# Patient Record
Sex: FEMALE | Race: OTHER | NOT HISPANIC OR LATINO | URBAN - METROPOLITAN AREA
[De-identification: names, ages, dates, MRNs, and addresses within clinical notes are randomized per-mention and may not be internally consistent; named-entity substitution may affect disease eponyms.]

---

## 2022-06-09 ENCOUNTER — APPOINTMENT (RX ONLY)
Dept: URBAN - METROPOLITAN AREA CLINIC 31 | Facility: CLINIC | Age: 67
Setting detail: DERMATOLOGY
End: 2022-06-09

## 2022-06-09 DIAGNOSIS — D49.2 NEOPLASM OF UNSPECIFIED BEHAVIOR OF BONE, SOFT TISSUE, AND SKIN: ICD-10-CM

## 2022-06-09 PROCEDURE — ? BIOPSY BY SHAVE METHOD

## 2022-06-09 PROCEDURE — 11102 TANGNTL BX SKIN SINGLE LES: CPT

## 2022-06-09 ASSESSMENT — LOCATION DETAILED DESCRIPTION DERM: LOCATION DETAILED: LEFT DISTAL PRETIBIAL REGION

## 2022-06-09 ASSESSMENT — LOCATION ZONE DERM: LOCATION ZONE: LEG

## 2022-06-09 ASSESSMENT — LOCATION SIMPLE DESCRIPTION DERM: LOCATION SIMPLE: LEFT PRETIBIAL REGION

## 2022-06-09 NOTE — PROCEDURE: MIPS QUALITY
Quality 111:Pneumonia Vaccination Status For Older Adults: Pneumococcal vaccine was not administered on or after patient’s 60th birthday and before the end of the measurement period, reason not otherwise specified

## 2022-06-09 NOTE — PROCEDURE: BIOPSY BY SHAVE METHOD
Detail Level: Detailed
Depth Of Biopsy: dermis
Was A Bandage Applied: Yes
Size Of Lesion In Cm: 3.5
X Size Of Lesion In Cm: 2.5
Biopsy Type: H and E
Biopsy Method: Dermablade
Anesthesia Type: 1% lidocaine with epinephrine
Anesthesia Volume In Cc: 0.5
Additional Anesthesia Volume In Cc (Will Not Render If 0): 0
Hemostasis: Electrocautery
Wound Care: Petrolatum
Dressing: bandage
Destruction After The Procedure: No
Type Of Destruction Used: Curettage
Curettage Text: The wound bed was treated with curettage after the biopsy was performed.
Cryotherapy Text: The wound bed was treated with cryotherapy after the biopsy was performed.
Electrodesiccation Text: The wound bed was treated with electrodesiccation after the biopsy was performed.
Electrodesiccation And Curettage Text: The wound bed was treated with electrodesiccation and curettage after the biopsy was performed.
Silver Nitrate Text: The wound bed was treated with silver nitrate after the biopsy was performed.
Lab: [id number]
Lab Facility: 
Consent: Written consent was obtained and risks were reviewed including but not limited to scarring, infection, bleeding, scabbing, incomplete removal, nerve damage and allergy to anesthesia.
Post-Care Instructions: I reviewed with the patient in detail post-care instructions. Patient is to keep the biopsy site dry overnight, and then apply bacitracin twice daily until healed. Patient may apply hydrogen peroxide soaks to remove any crusting.
Notification Instructions: Patient will be notified of biopsy results. However, patient instructed to call the office if not contacted within 2 weeks.
Billing Type: Third-Party Bill
Information: Selecting Yes will display possible errors in your note based on the variables you have selected. This validation is only offered as a suggestion for you. PLEASE NOTE THAT THE VALIDATION TEXT WILL BE REMOVED WHEN YOU FINALIZE YOUR NOTE. IF YOU WANT TO FAX A PRELIMINARY NOTE YOU WILL NEED TO TOGGLE THIS TO 'NO' IF YOU DO NOT WANT IT IN YOUR FAXED NOTE.

## 2022-11-10 ENCOUNTER — APPOINTMENT (RX ONLY)
Dept: URBAN - METROPOLITAN AREA CLINIC 31 | Facility: CLINIC | Age: 67
Setting detail: DERMATOLOGY
End: 2022-11-10

## 2022-11-10 DIAGNOSIS — L57.0 ACTINIC KERATOSIS: ICD-10-CM

## 2022-11-10 DIAGNOSIS — Z71.89 OTHER SPECIFIED COUNSELING: ICD-10-CM

## 2022-11-10 DIAGNOSIS — D18.0 HEMANGIOMA: ICD-10-CM

## 2022-11-10 DIAGNOSIS — D22 MELANOCYTIC NEVI: ICD-10-CM

## 2022-11-10 DIAGNOSIS — Z85.820 PERSONAL HISTORY OF MALIGNANT MELANOMA OF SKIN: ICD-10-CM

## 2022-11-10 DIAGNOSIS — L72.8 OTHER FOLLICULAR CYSTS OF THE SKIN AND SUBCUTANEOUS TISSUE: ICD-10-CM

## 2022-11-10 PROBLEM — D22.9 MELANOCYTIC NEVI, UNSPECIFIED: Status: ACTIVE | Noted: 2022-11-10

## 2022-11-10 PROBLEM — D18.01 HEMANGIOMA OF SKIN AND SUBCUTANEOUS TISSUE: Status: ACTIVE | Noted: 2022-11-10

## 2022-11-10 PROCEDURE — 99213 OFFICE O/P EST LOW 20 MIN: CPT | Mod: 25

## 2022-11-10 PROCEDURE — ? COUNSELING

## 2022-11-10 PROCEDURE — 17003 DESTRUCT PREMALG LES 2-14: CPT

## 2022-11-10 PROCEDURE — ? ADDITIONAL NOTES

## 2022-11-10 PROCEDURE — 17000 DESTRUCT PREMALG LESION: CPT

## 2022-11-10 PROCEDURE — ? LIQUID NITROGEN

## 2022-11-10 ASSESSMENT — LOCATION DETAILED DESCRIPTION DERM
LOCATION DETAILED: LEFT SUPERIOR FOREHEAD
LOCATION DETAILED: LEFT LATERAL BUCCAL CHEEK
LOCATION DETAILED: RIGHT SUPERIOR LATERAL FOREHEAD
LOCATION DETAILED: LEFT LATERAL FRONTAL SCALP
LOCATION DETAILED: NASAL ROOT

## 2022-11-10 ASSESSMENT — LOCATION SIMPLE DESCRIPTION DERM
LOCATION SIMPLE: LEFT SCALP
LOCATION SIMPLE: LEFT FOREHEAD
LOCATION SIMPLE: RIGHT FOREHEAD
LOCATION SIMPLE: NOSE
LOCATION SIMPLE: LEFT CHEEK

## 2022-11-10 ASSESSMENT — LOCATION ZONE DERM
LOCATION ZONE: FACE
LOCATION ZONE: NOSE
LOCATION ZONE: SCALP

## 2022-11-10 NOTE — PROCEDURE: LIQUID NITROGEN
Post-Care Instructions: I reviewed with the patient in detail post-care instructions. Patient is to wear sunprotection, and avoid picking at any of the treated lesions. Pt may apply Vaseline to crusted or scabbing areas.
Duration Of Freeze Thaw-Cycle (Seconds): 3
Application Tool (Optional): Liquid Nitrogen Sprayer
Render Note In Bullet Format When Appropriate: No
Show Aperture Variable?: Yes
Consent: The patient's consent was obtained including but not limited to risks of crusting, scabbing, blistering, scarring, darker or lighter pigmentary change, recurrence, incomplete removal and infection.
Detail Level: Detailed
Number Of Freeze-Thaw Cycles: 1 freeze-thaw cycle

## 2022-11-10 NOTE — PROCEDURE: ADDITIONAL NOTES
Additional Notes: -- Well-healed split-thickness skin graft scar on the left superior anterior thigh.\\n-- Continue with q3 month f/u at [facility] \\n-- Continue with Keytruda infusions co-managed by Linda and Dr. Meier. Referral already sent.\\n
Detail Level: Simple
Render Risk Assessment In Note?: yes

## 2022-11-10 NOTE — PROCEDURE: MIPS QUALITY
Quality 226: Preventive Care And Screening: Tobacco Use: Screening And Cessation Intervention: Patient screened for tobacco use and is an ex/non-smoker
Detail Level: Detailed
Quality 111:Pneumonia Vaccination Status For Older Adults: Pneumococcal vaccine (PPSV23) was not administered on or after patient’s 60th birthday and before the end of the measurement period, reason not otherwise specified
Quality 431: Preventive Care And Screening: Unhealthy Alcohol Use - Screening: Patient not identified as an unhealthy alcohol user when screened for unhealthy alcohol use using a systematic screening method
Quality 130: Documentation Of Current Medications In The Medical Record: Current Medications Documented
Quality 110: Preventive Care And Screening: Influenza Immunization: Influenza Immunization Administered during Influenza season

## 2023-02-08 ENCOUNTER — APPOINTMENT (RX ONLY)
Dept: URBAN - METROPOLITAN AREA CLINIC 31 | Facility: CLINIC | Age: 68
Setting detail: DERMATOLOGY
End: 2023-02-08

## 2023-02-08 DIAGNOSIS — M71 OTHER BURSOPATHIES: ICD-10-CM

## 2023-02-08 DIAGNOSIS — Z71.89 OTHER SPECIFIED COUNSELING: ICD-10-CM

## 2023-02-08 DIAGNOSIS — L57.0 ACTINIC KERATOSIS: ICD-10-CM

## 2023-02-08 DIAGNOSIS — L81.4 OTHER MELANIN HYPERPIGMENTATION: ICD-10-CM

## 2023-02-08 DIAGNOSIS — D22 MELANOCYTIC NEVI: ICD-10-CM

## 2023-02-08 DIAGNOSIS — Z85.820 PERSONAL HISTORY OF MALIGNANT MELANOMA OF SKIN: ICD-10-CM

## 2023-02-08 DIAGNOSIS — D18.0 HEMANGIOMA: ICD-10-CM

## 2023-02-08 DIAGNOSIS — L27.0 GENERALIZED SKIN ERUPTION DUE TO DRUGS AND MEDICAMENTS TAKEN INTERNALLY: ICD-10-CM

## 2023-02-08 DIAGNOSIS — L82.1 OTHER SEBORRHEIC KERATOSIS: ICD-10-CM

## 2023-02-08 PROBLEM — D22.5 MELANOCYTIC NEVI OF TRUNK: Status: ACTIVE | Noted: 2023-02-08

## 2023-02-08 PROBLEM — M71.372 OTHER BURSAL CYST, LEFT ANKLE AND FOOT: Status: ACTIVE | Noted: 2023-02-08

## 2023-02-08 PROBLEM — D18.01 HEMANGIOMA OF SKIN AND SUBCUTANEOUS TISSUE: Status: ACTIVE | Noted: 2023-02-08

## 2023-02-08 PROCEDURE — ? ADDITIONAL NOTES

## 2023-02-08 PROCEDURE — ? COUNSELING

## 2023-02-08 PROCEDURE — 17000 DESTRUCT PREMALG LESION: CPT

## 2023-02-08 PROCEDURE — 17003 DESTRUCT PREMALG LES 2-14: CPT

## 2023-02-08 PROCEDURE — 99213 OFFICE O/P EST LOW 20 MIN: CPT | Mod: 25

## 2023-02-08 PROCEDURE — ? LIQUID NITROGEN

## 2023-02-08 ASSESSMENT — LOCATION DETAILED DESCRIPTION DERM
LOCATION DETAILED: RIGHT UPPER CUTANEOUS LIP
LOCATION DETAILED: RIGHT MID-UPPER BACK
LOCATION DETAILED: LEFT DORSAL 2ND TOE
LOCATION DETAILED: RIGHT SUPERIOR LATERAL MALAR CHEEK
LOCATION DETAILED: RIGHT INFERIOR UPPER BACK
LOCATION DETAILED: LEFT DISTAL PRETIBIAL REGION
LOCATION DETAILED: RIGHT SUPERIOR UPPER BACK

## 2023-02-08 ASSESSMENT — LOCATION ZONE DERM
LOCATION ZONE: TOE
LOCATION ZONE: TRUNK
LOCATION ZONE: LEG
LOCATION ZONE: LIP
LOCATION ZONE: FACE

## 2023-02-08 ASSESSMENT — LOCATION SIMPLE DESCRIPTION DERM
LOCATION SIMPLE: RIGHT LIP
LOCATION SIMPLE: LEFT 2ND TOE
LOCATION SIMPLE: RIGHT UPPER BACK
LOCATION SIMPLE: RIGHT CHEEK
LOCATION SIMPLE: LEFT PRETIBIAL REGION

## 2023-02-08 NOTE — PROCEDURE: ADDITIONAL NOTES
Additional Notes: Patient states that she had a reaction to her Keytruda her Oncologist is aware and is not concerned about the reaction and it’s well controlled with the topical Triamcinolone
Render Risk Assessment In Note?: no
Detail Level: Detailed
Additional Notes: Last CT/PET 12/2022 clear, next one planned for 3/2023. Pt continuing Keytruda with Dr Meier, f/u with [facility] next week.

## 2023-02-08 NOTE — HPI: RASH
What Type Of Note Output Would You Prefer (Optional)?: Standard Output
Is The Patient Presenting As Previously Scheduled?: Yes
Is This A New Presentation, Or A Follow-Up?: Rash
Additional History: Patient states that this a result of her Kertruda, she was treated by her Oncologist prescribed Triamcinolone

## 2023-02-08 NOTE — PROCEDURE: MIPS QUALITY
Detail Level: Detailed
Quality 137: Melanoma: Continuity Of Care - Recall System: Patient information entered into a recall system that includes: target date for the next exam specified AND a process to follow up with patients regarding missed or unscheduled appointments
Quality 138: Melanoma: Coordination Of Care: A treatment plan was communicated to the physicians providing continuing care within one month of diagnosis outlining: diagnosis, tumor thickness and a plan for surgery or alternate care.
Quality 130: Documentation Of Current Medications In The Medical Record: Current Medications Documented
Quality 110: Preventive Care And Screening: Influenza Immunization: Influenza Immunization Administered during Influenza season
Quality 431: Preventive Care And Screening: Unhealthy Alcohol Use - Screening: Patient not identified as an unhealthy alcohol user when screened for unhealthy alcohol use using a systematic screening method
Quality 226: Preventive Care And Screening: Tobacco Use: Screening And Cessation Intervention: Patient screened for tobacco use and is an ex/non-smoker
Quality 397: Melanoma: Reporting: Pathology report includes the pT Category, thickness, ulceration and mitotic rate, peripheral and deep margin status and presence or absence of microsatellitosis for invasive tumors.
Quality 111:Pneumonia Vaccination Status For Older Adults: Pneumococcal vaccine (PPSV23) administered on or after patient’s 60th birthday and before the end of the measurement period

## 2023-02-08 NOTE — HPI: MELANOMA F/U (HISTORY OF MALIGNANT MELANOMA)
What Is The Reason For Today's Visit?: History of Melanoma
Additional History: Biopsied by Dr Lopez 6/2022, L shin 5.6 mm, One SLN positive, exc at [facility] 7/2022, on Keytruda with Dr Meier
Year Excised?: 7/2022 MM located on L shin 5.6 mm, one SLN positive

## 2023-04-27 ENCOUNTER — APPOINTMENT (RX ONLY)
Dept: URBAN - METROPOLITAN AREA CLINIC 31 | Facility: CLINIC | Age: 68
Setting detail: DERMATOLOGY
End: 2023-04-27

## 2023-04-27 DIAGNOSIS — L82.0 INFLAMED SEBORRHEIC KERATOSIS: ICD-10-CM

## 2023-04-27 PROCEDURE — ? COUNSELING

## 2023-04-27 PROCEDURE — 99212 OFFICE O/P EST SF 10 MIN: CPT

## 2023-04-27 ASSESSMENT — LOCATION SIMPLE DESCRIPTION DERM: LOCATION SIMPLE: RIGHT BREAST

## 2023-04-27 ASSESSMENT — LOCATION DETAILED DESCRIPTION DERM: LOCATION DETAILED: RIGHT INFRAMAMMARY CREASE (INNER QUADRANT)

## 2023-04-27 ASSESSMENT — LOCATION ZONE DERM: LOCATION ZONE: TRUNK

## 2023-04-27 NOTE — PROCEDURE: MIPS QUALITY
Quality 110: Preventive Care And Screening: Influenza Immunization: Influenza Immunization previously received during influenza season
Quality 431: Preventive Care And Screening: Unhealthy Alcohol Use - Screening: Patient not identified as an unhealthy alcohol user when screened for unhealthy alcohol use using a systematic screening method
Quality 111:Pneumonia Vaccination Status For Older Adults: Pneumococcal vaccine (PPSV23) administered on or after patient’s 60th birthday and before the end of the measurement period
Quality 226: Preventive Care And Screening: Tobacco Use: Screening And Cessation Intervention: Patient screened for tobacco use and is an ex/non-smoker
Detail Level: Detailed
Quality 130: Documentation Of Current Medications In The Medical Record: Current Medications Documented

## 2024-12-11 ENCOUNTER — TRANSCRIBE ORDERS (OUTPATIENT)
Dept: SCHEDULING | Facility: REHABILITATION | Age: 69
End: 2024-12-11

## 2024-12-11 DIAGNOSIS — C43.9 MALIGNANT MELANOMA OF SKIN, UNSPECIFIED: Primary | ICD-10-CM

## 2024-12-17 ENCOUNTER — HOSPITAL ENCOUNTER (OUTPATIENT)
Dept: PHYSICAL THERAPY | Facility: HOSPITAL | Age: 69
Setting detail: THERAPIES SERIES
Discharge: HOME | End: 2024-12-17
Attending: NURSE PRACTITIONER
Payer: MEDICARE

## 2024-12-17 DIAGNOSIS — I89.0 LYMPHEDEMA: Primary | ICD-10-CM

## 2024-12-17 DIAGNOSIS — C43.9 MALIGNANT MELANOMA OF SKIN, UNSPECIFIED: ICD-10-CM

## 2024-12-17 PROCEDURE — 97162 PT EVAL MOD COMPLEX 30 MIN: CPT | Mod: GP

## 2024-12-17 NOTE — LETTER
Dear DR. Cyr,    Thank you for this referral. Please review the attached notes and plan of care for your approval.  Please contact our department with any questions.     Sincerely,     Adri Pope, PT  [address], [address]  [place] [address]  Phone [phone]  Fax  [phone]    By co-signing this Plan of Care (POC) you agree to the following:  I have reviewed the the Plan of Care established by the therapist within this document and certify that the services are skilled and medically necessary. I have reviewed the plan and recommend that these services continue to meet the goals stated in this document.    PHYSICIAN SIGNATURE: __________________________________     DATE: ___________________  TIME: _____________           Physical Therapy Plan of Care 24   Effective from: 2024  Effective to: 2025    Plan ID: [id number]            Participants as of Finalize on 2024    Name Type Comments Contact Info    Adri Cyr Referring Provider  [phone]    Adri Pope, PT Physical Therapist         Last Progress Notes Note     Author: Adri Pope, PT Status: Signed Last edited: 2024 11:00 AM       Physical Therapy Evaluation    [facility] Fax: [phone]    PT EVALUATION FOR OUTPATIENT THERAPY    Patient: Ivon Dickens    MRN: [id number]  : 1955 69 y.o.     Referring Physician: Adri Cyr  Date of Visit: 2024      Certification Dates:  24 through 25         Recommended Frequency & Duration:  2 times/week for up to 8 weeks     Diagnosis:   1. Lymphedema    2. Malignant melanoma of skin, unspecified (CMS/HCC)        Chief Complaints:   Chief Complaint   Patient presents with   • Dec Strength   • Pain   • Other     Edema   • Decreased recreational/play activity   • Self Care Difficulties       Precautions:    Precautions additional comments: Melanoma L LE, L inguinal lymph nodes  removed    Past Medical History: History reviewed. No pertinent past medical history.    Past Surgical History: No past surgical history on file.      LEARNING ASSESSMENT    Assessment completed:  Yes    Learner name:  Ivon    Learner: Patient    Learning Barriers:  Learning barriers: No Barriers    Preferred Language: English     Needed: No    Education Provided:   Method: Discussion  Readiness: acceptance  Response: Demonstrated understanding      CO-LEARNER ASSESSMENT:    Completed: No      Welcome letter discussed: Yes Patient provided with Welcome Letter, which includes attendance policy. Provided education regarding cancellation and no-show policy. Education regarding the importance of participation and regular attendance to maximize goal attainment.       OBJECTIVE MEASUREMENTS/DATA:    Time In Session:  Start Time: 1103  Stop Time: 1140  Time Calculation (min): 37 min     General Information - 12/17/24 1102          Session Details    Document Type initial evaluation     Mode of Treatment individual therapy        General Information    Referring Physician Adri Cyr     History of present illness/functional impairment Pt is a 70 y/o female who presents to OPPT IE with a dx of L LE lymphedema following surgery for melanoma in her L LE in 2022. She had this surgery and was diagnosed in November 2022. She wore medi reduction wraps for her entire L LE and now wears a compression thigh high stocking daily. She also does self MLD daily. She had inguinal lymphadenectomy surgery on 12/12/24 and now is worried about a flare up of her lymphedema. She does notice her L LE is more swollen. She has a drain still currently in. She lives in [place] but is in [place] often as she sees an oncologist at [place]. She will likely be local until the middle of January. She would like to come to therapy if possible while she is still in town. She is interested in compression biker shorts and potentially a  compression pump.     Precautions comments Melanoma L LE, L inguinal lymph nodes removed         Services    Do You Speak a Language Other Than English at Home? no        Behavioral Health Related Communication    Suicidal Ideation No                    Pain/Vitals - 12/17/24 1102          Pain Assessment    Currently in pain No/Denies                    Falls/Food Screening - 12/17/24 1102          Initial Falls Assessment    One or more falls in the last year No        Food Insecurity    Within the past 12 months, you worried that your food would run out before you got the money to buy more. Never true     Within the past 12 months, the food you bought just didn't last and you didn't have money to get more. Never true                    PT - 12/17/24 1102          Physical Therapy    Physical Therapy Specialty Lymphedema Program        PT Plan    Frequency of treatment 2 times/week     PT Duration 8 weeks     PT Cert From 12/17/24     PT Cert To 02/17/25     Date PT POC was sent to provider 12/17/24     Signed PT Plan of Care received?  No                       Living Environment    Living Environment - 12/17/24 1102          Living Environment    People in Home sibling(s)     Living Arrangements house     Living Environment Comment Pt currently staying with her brother and sister-in-law in [place]. She lives full time in [place].     Transportation Concerns none        Relationship/Environment    Name(s) of People in Home Staying with her brother and sister-in-law currently                   PLOF:    Prior Level of Function - 12/17/24 1102          OTHER    Previous level of function (I) with ADLs, travels often                   Lymphedema:    Lymphedema Assessment       Row Name 12/17/24 1102       BODY LOCATION    Upper Body / Lower Body / Face and Neck Lower body       LE Skin    Skin Condition Impaired  Scar on L anterior shin from melanoma surgery in 2022    Color pink    Quality dry     Edema +1  L LE    Wound CDI       LE/Back/Trunk Palpation    LE Palpation  -stemmer sign L LE, -TTP L LE       Lower Body Outcome Measures    LLIS results/comments  11/72       LE Volumetrics    LE Volume Measurements LE       Affected LE Measurements    Affected limb laterality Left    MTP 23 cm    -8 cm 25 cm    -12 cm 31 cm    0 cm 26 cm    4 cm 27.3 cm    8 cm 35 cm    12 cm 42 cm    16 cm 45 cm    20 cm 42.5 cm    24 cm 43 cm    28 cm 48 cm    32 cm 52 cm    36 cm 57 cm    40 cm 62 cm    44 cm 60.5 cm    48 cm 66.5 cm    52 cm 68 cm    Affected LE Total Volume (ml) 846378.27 ml       Unaffected LE Measurements    Unaffected limb laterality Right    MTP 22.5 cm    -8 cm 25 cm    -12 cm 26.5 cm    0 cm 27.3 cm    4 cm 28.5 cm    8 cm 30 cm    12 cm 38 cm    16 cm 41.5 cm    20 cm 45 cm    24 cm 45 cm    28 cm 40.5 cm    32 cm 44.5 cm    36 cm 51 cm    40 cm 57 cm    44 cm 59.5 cm    48 cm 63 cm    52 cm 67 cm    Unaffected LE Total Volume (ml) 588844.65 ml       RIGHT: Lower Extremity Manual Muscle Test Assessment    Hip Flexion gross movement (5/5) normal    Hip Abduction gross movement (5/5) normal    Hip Adduction gross movement (5/5) normal    Knee Flexion strength (5/5) normal    Knee Extension strength (5/5) normal    Ankle Dorsiflexion gross movement (5/5) normal    Ankle Plantarflexion gross movement (5/5) normal       LEFT: Lower Extremity Manual Muscle Test Assessment    Hip Flexion gross movement (3-/5) fair minus    Hip Abduction gross movement (3/5) fair    Hip Adduction gross movement (4/5) good    Knee Flexion strength (5/5) normal    Knee Extension strength (5/5) normal    Ankle Dorsiflexion gross movement (5/5) normal    Ankle Plantarflexion gross movement (5/5) normal       Gait Training    Gait/Stairs Locomotion gait/ambulation independence       Assessment    Plan of Care reviewed and patient/family in agreement Yes    System Pathology/Pathophysiology Noted  lymphatic;musculoskeletal;integumentary    Functional Limitations in Following Categories (PT Eval) community/leisure    Rehab Potential/Prognosis good, to achieve stated therapy goals    Problem List decreased strength;edema;pain    Actions taken IE    Clinical Assessment Pt is a 68 y/o female who presents to OPPT IE with a dx of L LE lymphedema s/p melanoma surgery in 2022. Pt recently had inguinofemoral lymphadenectomy which lead to a flare up of L LE lymphedema. Pt presents with increased edema in L LE > R LE, weakness in L LE, and difficulty don/doff compression. She would benefit from skilled PT with use of CDT in order to address these impairments and maximize pt's function and QOL.    Plan and Recommendations L LE MLD, order compression biker shorts, consider compression pump in future    Planned Services CPT 01599 Manual therapy;CPT 02636 Neuromuscular Reeducation;CPT 22591 Therapeutic activities;CPT 16664 Self-care/Home management training;CPT 68593 Therapeutic exercises;CPT 63630 Therapeutic Massage;CPT 12878 Hot/Cold Packs therapy    Comments/Additional Services lymphatouch, order compression                  Outcome Measures    PT Outcome Measures - 12/17/24 1102          Other Outcome Measures Used/Comments    Other outcome measure used: LLIS 2: 11/72                      Goals       • Lymph Goals      Short Term Goals- 4 weeks  Patient will be (I) with self MLD for  LLE, including short neck, deep breathing, and visceral sequence  Patient will own well fitting compression biker shorts  Patient will reduce L LE by >/= 1000mL  Patient will be (I) with initial HEP for decongestion and hip strengthening    Long Term Goals- 8 weeks  Patient will have compression pump ordered if she decides to do so  Patient will reduce LLE by >/= 2000mL  Patient will improve L LE MMT to at least 4/5 throughout  Patient will improve LLIS score to </= 7/72  Patient will be (I) with don/doff compression, self lymphatic  drainage, and finalized HEP        • Mutually agreed upon pain goal      Mutually agreed upon pain goal: 0/10      • Patient Stated Goal      Help manage her lymphedema and prevent from worsening post op                TREATMENT PLAN:      PT Lymph Exercises Current Session Time   MODALITIES  CPT 99689 TOTAL TIME FOR SESSION Not performed           THER ACT  CPT 08387 TOTAL TIME FOR SESSION Not performed   Bed Mobility    Transfer Training    Body Mechanics/Work Training    Patient Education    THER EX  CPT 58029 TOTAL TIME FOR SESSION Not performed   CARDIOVASCULAR    Nu Step NV   UBE    STRENGTHENING     Supine Ther-Ex    Standing Ther-Ex Decongestion Ekaterina/hip strength- NV   Seated Ther-Ex    STRETCHING    Core Stabilization    LE Stretching    UE Stretching    Spinal Stretching    Postural     REPEATED MOVEMENTS    NEUROMUSCULAR RE-ED  CPT 31030 TOTAL TIME FOR SESSION Not performed   COORDINATION    POSTURAL RE-ED    PRE-GAIT ACTIVITIES    BALANCE TRAINING    Sitting Balance    Standing Balance    KINESIOTAPE    GAIT TRAINING  CPT 73082 TOTAL TIME FOR SESSION Not performed   Ambulation     Dynamic Gait    MANUAL   CPT 01232 TOTAL TIME FOR SESSION Not performed   Education Lymphedema, CDT, skin care, compression biker shorts, compression pumps- YES   Stretching    Mobilization    Massage- Deep Tissue, Scar, Transverse Friction    Manual Lymph Drainage L UE MLD- NV  Self MLD- NV   Skin Care- Lotion/Wrapping    Measurement/Fitting Juzo dynamic biker shorts size V length regular- sending to [facility]   Skin Stretching/Mobilization for Cording      Self Care ADL/Home management  CPT 16491 TOTAL TIME FOR SESSION Not performed      Garment Don/Doff    Education    Wearing Schedule         ASSESSMENT:    This 69 y.o. year old female presents to PT with above stated diagnosis. Physical Therapy evaluation reveals decreased strength, edema, pain resulting in community/leisure limitations. Ivon Dickens will benefit from  skilled PT services to address limitation, work towards rehab and patient goals and maximize PLOF of chosen ADLs.     Planned Services: The patient's treatment will include CPT 77630 Manual therapy, CPT 59164 Neuromuscular Reeducation, CPT 42714 Therapeutic activities, CPT 11146 Self-care/Home management training, CPT 34523 Therapeutic exercises, CPT 51039 Therapeutic Massage, CPT 27229 Hot/Cold Packs therapy,lymphatouch, order compression.     Adri Pope, PT                           Current Participants as of 12/17/2024    Name Type Comments Contact Info    Adri Cyr Referring Provider  [phone]    Signature pending    Adri Pope, PT Physical Therapist      Signature pending

## 2024-12-17 NOTE — OP PT TREATMENT LOG
PT Lymph Exercises Current Session Time   MODALITIES  CPT 59245 TOTAL TIME FOR SESSION Not performed           THER ACT  CPT 85656 TOTAL TIME FOR SESSION Not performed   Bed Mobility    Transfer Training    Body Mechanics/Work Training    Patient Education    THER EX  CPT 62578 TOTAL TIME FOR SESSION Not performed   CARDIOVASCULAR    Nu Step NV   UBE    STRENGTHENING     Supine Ther-Ex    Standing Ther-Ex Decongestion Ekaterina/hip strength- NV   Seated Ther-Ex    STRETCHING    Core Stabilization    LE Stretching    UE Stretching    Spinal Stretching    Postural     REPEATED MOVEMENTS    NEUROMUSCULAR RE-ED  CPT 10506 TOTAL TIME FOR SESSION Not performed   COORDINATION    POSTURAL RE-ED    PRE-GAIT ACTIVITIES    BALANCE TRAINING    Sitting Balance    Standing Balance    KINESIOTAPE    GAIT TRAINING  CPT 04875 TOTAL TIME FOR SESSION Not performed   Ambulation     Dynamic Gait    MANUAL   CPT 68549 TOTAL TIME FOR SESSION Not performed   Education Lymphedema, CDT, skin care, compression biker shorts, compression pumps- YES   Stretching    Mobilization    Massage- Deep Tissue, Scar, Transverse Friction    Manual Lymph Drainage L UE MLD- NV  Self MLD- NV   Skin Care- Lotion/Wrapping    Measurement/Fitting Juzo dynamic biker shorts size V length regular- sending to [facility]   Skin Stretching/Mobilization for Cording      Self Care ADL/Home management  CPT 78462 TOTAL TIME FOR SESSION Not performed      Garment Don/Doff    Education    Wearing Schedule

## 2024-12-17 NOTE — PROGRESS NOTES
Physical Therapy Evaluation    [facility] Fax: [phone]    PT EVALUATION FOR OUTPATIENT THERAPY    Patient: Ivon Dickens    MRN: [id number]  : 1955 69 y.o.     Referring Physician: Adri Cyr  Date of Visit: 2024      Certification Dates:  24 through 25         Recommended Frequency & Duration:  2 times/week for up to 8 weeks     Diagnosis:   1. Lymphedema    2. Malignant melanoma of skin, unspecified (CMS/HCC)        Chief Complaints:   Chief Complaint   Patient presents with    Dec Strength    Pain    Other     Edema    Decreased recreational/play activity    Self Care Difficulties       Precautions:    Precautions additional comments: Melanoma L LE, L inguinal lymph nodes removed    Past Medical History: History reviewed. No pertinent past medical history.    Past Surgical History: No past surgical history on file.      LEARNING ASSESSMENT    Assessment completed:  Yes    Learner name:  Ivon    Learner: Patient    Learning Barriers:  Learning barriers: No Barriers    Preferred Language: English     Needed: No    Education Provided:   Method: Discussion  Readiness: acceptance  Response: Demonstrated understanding      CO-LEARNER ASSESSMENT:    Completed: No      Welcome letter discussed: Yes Patient provided with Welcome Letter, which includes attendance policy. Provided education regarding cancellation and no-show policy. Education regarding the importance of participation and regular attendance to maximize goal attainment.       OBJECTIVE MEASUREMENTS/DATA:    Time In Session:  Start Time: 1103  Stop Time: 1140  Time Calculation (min): 37 min     General Information - 24 1102          Session Details    Document Type initial evaluation     Mode of Treatment individual therapy        General Information    Referring Physician Adri Cyr     History of present illness/functional impairment Pt is a 68 y/o female who presents to OPP[facility] IE  with a dx of L LE lymphedema following surgery for melanoma in her L LE in 2022. She had this surgery and was diagnosed in November 2022. She wore medi reduction wraps for her entire L LE and now wears a compression thigh high stocking daily. She also does self MLD daily. She had inguinal lymphadenectomy surgery on 12/12/24 and now is worried about a flare up of her lymphedema. She does notice her L LE is more swollen. She has a drain still currently in. She lives in [place] but is in [place] often as she sees an oncologist at [place]. She will likely be local until the middle of January. She would like to come to therapy if possible while she is still in town. She is interested in compression biker shorts and potentially a compression pump.     Precautions comments Melanoma L LE, L inguinal lymph nodes removed         Services    Do You Speak a Language Other Than English at Home? no        Behavioral Health Related Communication    Suicidal Ideation No                    Pain/Vitals - 12/17/24 1102          Pain Assessment    Currently in pain No/Denies                    Falls/Food Screening - 12/17/24 1102          Initial Falls Assessment    One or more falls in the last year No        Food Insecurity    Within the past 12 months, you worried that your food would run out before you got the money to buy more. Never true     Within the past 12 months, the food you bought just didn't last and you didn't have money to get more. Never true                    PT - 12/17/24 1102          Physical Therapy    Physical Therapy Specialty Lymphedema Program        PT Plan    Frequency of treatment 2 times/week     PT Duration 8 weeks     PT Cert From 12/17/24     PT Cert To 02/17/25     Date PT POC was sent to provider 12/17/24     Signed PT Plan of Care received?  No                       Living Environment    Living Environment - 12/17/24 1102          Living Environment    People in Home sibling(s)      Living Arrangements house     Living Environment Comment Pt currently staying with her brother and sister-in-law in [place]. She lives full time in [place].     Transportation Concerns none        Relationship/Environment    Name(s) of People in Home Staying with her brother and sister-in-law currently                   PLOF:    Prior Level of Function - 12/17/24 1102          OTHER    Previous level of function (I) with ADLs, travels often                   Lymphedema:    Lymphedema Assessment       Row Name 12/17/24 1102       BODY LOCATION    Upper Body / Lower Body / Face and Neck Lower body       LE Skin    Skin Condition Impaired  Scar on L anterior shin from melanoma surgery in 2022    Color pink    Quality dry    Edema +1  L LE    Wound CDI       LE/Back/Trunk Palpation    LE Palpation  -stemmer sign L LE, -TTP L LE       Lower Body Outcome Measures    LLIS results/comments  11/72       LE Volumetrics    LE Volume Measurements LE       Affected LE Measurements    Affected limb laterality Left    MTP 23 cm    -8 cm 25 cm    -12 cm 31 cm    0 cm 26 cm    4 cm 27.3 cm    8 cm 35 cm    12 cm 42 cm    16 cm 45 cm    20 cm 42.5 cm    24 cm 43 cm    28 cm 48 cm    32 cm 52 cm    36 cm 57 cm    40 cm 62 cm    44 cm 60.5 cm    48 cm 66.5 cm    52 cm 68 cm    Affected LE Total Volume (ml) 090961.27 ml       Unaffected LE Measurements    Unaffected limb laterality Right    MTP 22.5 cm    -8 cm 25 cm    -12 cm 26.5 cm    0 cm 27.3 cm    4 cm 28.5 cm    8 cm 30 cm    12 cm 38 cm    16 cm 41.5 cm    20 cm 45 cm    24 cm 45 cm    28 cm 40.5 cm    32 cm 44.5 cm    36 cm 51 cm    40 cm 57 cm    44 cm 59.5 cm    48 cm 63 cm    52 cm 67 cm    Unaffected LE Total Volume (ml) 335289.65 ml       RIGHT: Lower Extremity Manual Muscle Test Assessment    Hip Flexion gross movement (5/5) normal    Hip Abduction gross movement (5/5) normal    Hip Adduction gross movement (5/5) normal    Knee Flexion strength (5/5) normal     Knee Extension strength (5/5) normal    Ankle Dorsiflexion gross movement (5/5) normal    Ankle Plantarflexion gross movement (5/5) normal       LEFT: Lower Extremity Manual Muscle Test Assessment    Hip Flexion gross movement (3-/5) fair minus    Hip Abduction gross movement (3/5) fair    Hip Adduction gross movement (4/5) good    Knee Flexion strength (5/5) normal    Knee Extension strength (5/5) normal    Ankle Dorsiflexion gross movement (5/5) normal    Ankle Plantarflexion gross movement (5/5) normal       Gait Training    Gait/Stairs Locomotion gait/ambulation independence       Assessment    Plan of Care reviewed and patient/family in agreement Yes    System Pathology/Pathophysiology Noted lymphatic;musculoskeletal;integumentary    Functional Limitations in Following Categories (PT Eval) community/leisure    Rehab Potential/Prognosis good, to achieve stated therapy goals    Problem List decreased strength;edema;pain    Actions taken IE    Clinical Assessment Pt is a 68 y/o female who presents to OPPT IE with a dx of L LE lymphedema s/p melanoma surgery in 2022. Pt recently had inguinofemoral lymphadenectomy which lead to a flare up of L LE lymphedema. Pt presents with increased edema in L LE > R LE, weakness in L LE, and difficulty don/doff compression. She would benefit from skilled PT with use of CDT in order to address these impairments and maximize pt's function and QOL.    Plan and Recommendations L LE MLD, order compression biker shorts, consider compression pump in future    Planned Services CPT 68584 Manual therapy;CPT 13996 Neuromuscular Reeducation;CPT 64594 Therapeutic activities;CPT 95266 Self-care/Home management training;CPT 95326 Therapeutic exercises;CPT 91992 Therapeutic Massage;CPT 96599 Hot/Cold Packs therapy    Comments/Additional Services lymphatouch, order compression                  Outcome Measures    PT Outcome Measures - 12/17/24 1102          Other Outcome Measures Used/Comments     Other outcome measure used: LLIS 2: 11/72                      Goals        Lymph Goals      Short Term Goals- 4 weeks  Patient will be (I) with self MLD for  LLE, including short neck, deep breathing, and visceral sequence  Patient will own well fitting compression biker shorts  Patient will reduce L LE by >/= 1000mL  Patient will be (I) with initial HEP for decongestion and hip strengthening    Long Term Goals- 8 weeks  Patient will have compression pump ordered if she decides to do so  Patient will reduce LLE by >/= 2000mL  Patient will improve L LE MMT to at least 4/5 throughout  Patient will improve LLIS score to </= 7/72  Patient will be (I) with don/doff compression, self lymphatic drainage, and finalized HEP         Mutually agreed upon pain goal      Mutually agreed upon pain goal: 0/10       Patient Stated Goal      Help manage her lymphedema and prevent from worsening post op                TREATMENT PLAN:      PT Lymph Exercises Current Session Time   MODALITIES  CPT 12935 TOTAL TIME FOR SESSION Not performed           THER ACT  CPT 09141 TOTAL TIME FOR SESSION Not performed   Bed Mobility    Transfer Training    Body Mechanics/Work Training    Patient Education    THER EX  CPT 66521 TOTAL TIME FOR SESSION Not performed   CARDIOVASCULAR    Nu Step NV   UBE    STRENGTHENING     Supine Ther-Ex    Standing Ther-Ex Decongestion Ekaterina/hip strength- NV   Seated Ther-Ex    STRETCHING    Core Stabilization    LE Stretching    UE Stretching    Spinal Stretching    Postural     REPEATED MOVEMENTS    NEUROMUSCULAR RE-ED  CPT 19855 TOTAL TIME FOR SESSION Not performed   COORDINATION    POSTURAL RE-ED    PRE-GAIT ACTIVITIES    BALANCE TRAINING    Sitting Balance    Standing Balance    KINESIOTAPE    GAIT TRAINING  CPT 74083 TOTAL TIME FOR SESSION Not performed   Ambulation     Dynamic Gait    MANUAL   CPT 44685 TOTAL TIME FOR SESSION Not performed   Education Lymphedema, CDT, skin care, compression biker shorts,  compression pumps- YES   Stretching    Mobilization    Massage- Deep Tissue, Scar, Transverse Friction    Manual Lymph Drainage L UE MLD- NV  Self MLD- NV   Skin Care- Lotion/Wrapping    Measurement/Fitting Juzo dynamic biker shorts size V length regular- sending to H   Skin Stretching/Mobilization for Cording      Self Care ADL/Home management  CPT 42549 TOTAL TIME FOR SESSION Not performed      Garment Don/Doff    Education    Wearing Schedule         ASSESSMENT:    This 69 y.o. year old female presents to PT with above stated diagnosis. Physical Therapy evaluation reveals decreased strength, edema, pain resulting in community/leisure limitations. Ivon Dickens will benefit from skilled PT services to address limitation, work towards rehab and patient goals and maximize PLOF of chosen ADLs.     Planned Services: The patient's treatment will include CPT 09900 Manual therapy, CPT 46640 Neuromuscular Reeducation, CPT 78193 Therapeutic activities, CPT 48245 Self-care/Home management training, CPT 44541 Therapeutic exercises, CPT 69460 Therapeutic Massage, CPT 32397 Hot/Cold Packs therapy,lymphatouch, order compression.     Adri Pope, PT

## 2025-02-18 ENCOUNTER — HOSPITAL ENCOUNTER (OUTPATIENT)
Dept: PHYSICAL THERAPY | Facility: HOSPITAL | Age: 70
Setting detail: THERAPIES SERIES
Discharge: HOME | End: 2025-02-18
Attending: NURSE PRACTITIONER
Payer: MEDICARE

## 2025-02-18 DIAGNOSIS — C43.9 MALIGNANT MELANOMA OF SKIN, UNSPECIFIED: Primary | ICD-10-CM

## 2025-02-18 DIAGNOSIS — I89.0 LYMPHEDEMA: ICD-10-CM

## 2025-02-18 PROCEDURE — 97140 MANUAL THERAPY 1/> REGIONS: CPT | Mod: GP

## 2025-02-18 NOTE — OP PT TREATMENT LOG
PT Lymph Exercises Current Session Time   MODALITIES  CPT 51031 TOTAL TIME FOR SESSION Not performed           THER ACT  CPT 19595 TOTAL TIME FOR SESSION Not performed   Bed Mobility    Transfer Training    Body Mechanics/Work Training    Patient Education    THER EX  CPT 30284 TOTAL TIME FOR SESSION Not performed   CARDIOVASCULAR    Nu Step NV   UBE    STRENGTHENING     Supine Ther-Ex    Standing Ther-Ex Decongestion Ekaterina/hip strength- NV   Seated Ther-Ex    STRETCHING    Core Stabilization    LE Stretching    UE Stretching    Spinal Stretching    Postural     REPEATED MOVEMENTS    NEUROMUSCULAR RE-ED  CPT 23287 TOTAL TIME FOR SESSION Not performed   COORDINATION    POSTURAL RE-ED    PRE-GAIT ACTIVITIES    BALANCE TRAINING    Sitting Balance    Standing Balance    KINESIOTAPE    GAIT TRAINING  CPT 96498 TOTAL TIME FOR SESSION Not performed   Ambulation     Dynamic Gait    MANUAL   CPT 88921 TOTAL TIME FOR SESSION 53-67 Minutes   Education Lymphedema, CDT, skin care, compression biker shorts, compression pumps- YES   Stretching    Mobilization    Massage- Deep Tissue, Scar, Transverse Friction    Manual Lymph Drainage L UE MLD- educated on, demonstrated, provided handouts- YES  Self MLD- NV   Skin Care- Lotion/Wrapping    Measurement/Fitting Juzo dynamic biker shorts size V length regular- pt owns    L LE volume measurements, LLIS, goal assessment for D/C- YES   Skin Stretching/Mobilization for Cording      Self Care ADL/Home management  CPT 91835 TOTAL TIME FOR SESSION Not performed      Garment Don/Doff    Education    Wearing Schedule

## 2025-02-18 NOTE — PROGRESS NOTES
Physical Therapy Discharge      PT DISCHARGE NOTE FOR OUTPATIENT THERAPY    Patient: Ivon Dickens MRN: [id number]  : 1955 69 y.o.  Referring Physician: Adri Cyr  Date of Visit: 2025      Certification Dates: 24 through 25    Total Visit Count: 2    Diagnosis:   1. Malignant melanoma of skin, unspecified (CMS/HCC)    2. Lymphedema        Chief Complaints:  No chief complaint on file.      Precautions:  Precautions comments: Melanoma L LE, L inguinal lymph nodes removed      TODAY'S VISIT:    Time In Session:  Start Time: 1504  Stop Time: 1600  Time Calculation (min): 56 min   General Information - 25 1503          Session Details    Document Type discharge evaluation        General Information    Referring Physician Adri Cyr     History of present illness/functional impairment Pt is a 68 y/o female who presents to OPPT IE with a dx of L LE lymphedema following surgery for melanoma in her L LE in . She had this surgery and was diagnosed in 2022. She wore medi reduction wraps for her entire L LE and now wears a compression thigh high stocking daily. She also does self MLD daily. She had inguinal lymphadenectomy surgery on 24 and now is worried about a flare up of her lymphedema. She does notice her L LE is more swollen. She has a drain still currently in. She lives in [place] but is in [place] often as she sees an oncologist at [place]. She will likely be local until the middle of January. She would like to come to therapy if possible while she is still in town. She is interested in compression biker shorts and potentially a compression pump.     Patient/Family/Caregiver Comments/Observations Pt reports to lymphedema PT 2 months after IE. Pt had to cancel all appointments over past 2 months due to still having drain in from surgery so being unable to have lymphedema treatment. Pt had drain removed yesterday and was cleared for lymphatic  massage by doctor. Reports she is now cleared from all restrictions. She has a bandage over drain site and had small drainage overnight but nothing significant. She reports drain sight and L thigh are tender to the touch but otherwise denies pain. Reports she feels her L leg edema is worsening but likely due to being unable to do massage, wear compression, or do anything strenous/exercise. She has an infusion and meeting with oncologist on Friday. She is planning on going to [facility] after that until the end of April. Due to pt's travel plans, pt is okay with being D/C from lymphedema therapy today. Pt would like to review self massage and components of CDT she should be doing. Will likely return to lymph PT when she returns to [place] in April.     Precautions comments Melanoma L LE, L inguinal lymph nodes removed         Services    Do You Speak a Language Other Than English at Home? no                      Pain/Vitals - 02/18/25 1503          Pain Assessment    Currently in pain Yes     Preferred Pain Scale number (Numeric Rating Pain Scale)     Pain Side/Orientation left     Pain: Body location Leg   Thigh, groin over drain site    Pain Rating (0-10): Pre Activity 1     Pain Rating (0-10): Activity 1     Pain Rating (0-10): Post Activity 1        Pain Intervention    Intervention  MLD     Post Intervention Comments no change in tenderness in these areas                    PT - 02/18/25 1503          Physical Therapy    Physical Therapy Specialty Lymphedema Program        PT Plan    Frequency of treatment 2 times/week     PT Duration 8 weeks     PT Cert From 12/17/24     PT Cert To 02/17/25     Date PT POC was sent to provider 12/17/24     Signed PT Plan of Care received?  Yes                    Assessment and Plan - 02/18/25 1502          Assessment    Plan of Care reviewed and patient/family in agreement Yes     System Pathology/Pathophysiology Noted lymphatic;musculoskeletal;integumentary      Functional Limitations in Following Categories (PT Eval) community/leisure     Rehab Potential/Prognosis good, to achieve stated therapy goals     Problem List decreased ROM;decreased flexibility;decreased strength;edema     Actions taken IE and 1 visit of OPPT     Clinical Assessment Pt is a 68 y/o female who has attended 2 sessions of OPPT for a dx of L LE lymphedema s/p melanoma surgery in 2022 and inguinofemoral lymphadenectomy in 2024. Pt's progress was limited due to pt having drain in for much longer than expected and had to cancel majority of PT appointments. Pt has however demonstrated a reduction in L LE edema. Pt owns compression velcro wraps for L LE, thigh high compression stocking for L LE, and compression biker shorts. PT spent majority of D/C appointment reviewing garment wear, self MLD education and demonstration, and overall lymphedema management education. Pt is traveling to [facility] so will be D/C from lymphedema PT today. Pt would be a good candidate for lymphedema PT in the future when she returns to [facility].     Plan and Recommendations D/C to independent lymphedema management     Planned Services CPT 10662 Manual therapy;CPT 87655 Neuromuscular Reeducation;CPT 19834 Therapeutic activities;CPT 96685 Self-care/Home management training;CPT 27426 Therapeutic exercises;CPT 57743 Therapeutic Massage;CPT 57359 Hot/Cold Packs therapy                        OBJECTIVE MEASUREMENTS/DATA:    Lymphedema:    Lymphedema Assessment       Row Name 02/18/25 1500       BODY LOCATION    Upper Body / Lower Body / Face and Neck Lower body       LE Volumetrics    LE Volume Measurements LE       Affected LE Measurements    Affected limb laterality Left    MTP 22 cm    -8 cm 27 cm    -12 cm 29 cm    0 cm 28.5 cm    4 cm 30 cm    8 cm 32.2 cm    12 cm 36 cm    16 cm 42 cm    20 cm 46 cm    24 cm 46 cm    28 cm 47 cm    32 cm 53 cm    36 cm 58 cm    40 cm 54 cm    44 cm 61 cm    48 cm 65 cm    52 cm 70 cm    Affected LE  Total Volume (ml) 815902.14 ml                  Lymph Cumulative Data:   Lymphedema          Most Recent Value    12/17/2024 - 2/18/2025 12/17/2024    11:02 2/18/2025    15:00   VOLUMETRICS   Affected limb laterality Left  2/18/2025 Left Left   MTP 22 cm  2/18/2025 23 cm 22 cm   -8 cm 27 cm  2/18/2025 25 cm 27 cm   -12 cm 29 cm  2/18/2025 31 cm 29 cm   0 cm 28.5 cm  2/18/2025 26 cm 28.5 cm   4 cm 30 cm  2/18/2025 27.3 cm 30 cm   8 cm 32.2 cm  2/18/2025 35 cm 32.2 cm   12 cm 36 cm  2/18/2025 42 cm 36 cm   16 cm 42 cm  2/18/2025 45 cm 42 cm   20 cm 46 cm  2/18/2025 42.5 cm 46 cm   24 cm 46 cm  2/18/2025 43 cm 46 cm   28 cm 47 cm  2/18/2025 48 cm 47 cm   32 cm 53 cm  2/18/2025 52 cm 53 cm   36 cm 58 cm  2/18/2025 57 cm 58 cm   40 cm 54 cm  2/18/2025 62 cm 54 cm   44 cm 61 cm  2/18/2025 60.5 cm 61 cm   48 cm 65 cm  2/18/2025 66.5 cm 65 cm   52 cm 70 cm  2/18/2025 68 cm 70 cm   Affected LE Total Volume (ml) 871164.14 ml  2/18/2025 849705.27 ml 019210.14 ml   Unaffected limb laterality Right  12/17/2024 Right    MTP 22.5 cm  12/17/2024 22.5 cm    -8 cm 25 cm  12/17/2024 25 cm    -12 cm 26.5 cm  12/17/2024 26.5 cm    0 cm 27.3 cm  12/17/2024 27.3 cm    4 cm 28.5 cm  12/17/2024 28.5 cm    8 cm 30 cm  12/17/2024 30 cm    12 cm 38 cm  12/17/2024 38 cm    16 cm 41.5 cm  12/17/2024 41.5 cm    20 cm 45 cm  12/17/2024 45 cm    24 cm 45 cm  12/17/2024 45 cm    28 cm 40.5 cm  12/17/2024 40.5 cm    32 cm 44.5 cm  12/17/2024 44.5 cm    36 cm 51 cm  12/17/2024 51 cm    40 cm 57 cm  12/17/2024 57 cm    44 cm 59.5 cm  12/17/2024 59.5 cm    48 cm 63 cm  12/17/2024 63 cm    52 cm 67 cm  12/17/2024 67 cm    Unaffected LE Total Volume (ml) 307603.65 ml  12/17/2024 983168.65 ml    Difference (ml) 50529.62 ml  12/17/2024 80274.62 ml    Difference (%) 12.15 %  12/17/2024 12.15 %      Outcome Measures    PT Outcome Measures - 02/18/25 1502          Other Outcome Measures Used/Comments    Other outcome measure used: CARITO 2: 13/72                      ROM and MMT          12/17/2024    11:02   PT LE MMT   Right Hip Flexion (5/5) normal   Left Hip Flexion (3-/5) fair minus   Right Hip ABD (5/5) normal   Left Hip ABD (3/5) fair   Right Hip ADD (5/5) normal   Left Hip ADD (4/5) good   Right Knee Flexion (5/5) normal   Left Knee Flexion (5/5) normal   Right Knee Extension (5/5) normal   Left Knee Extension (5/5) normal   Right Ankle DF (5/5) normal   Left Ankle DF (5/5) normal   Right Ankle PF (5/5) normal   Left Ankle PF (5/5) normal     Outcome Measures          12/17/2024    11:02 2/18/2025    15:02   PT SUBJECTIVE Outcome Measures   Other LLIS 2: 11/72 LLIS 2: 13/72          Today's Treatment:    Education provided:  Yes: See treatment log for details of education provided  Methods: Discussion, Handout, and Demonstration  Readiness: acceptance and eager  Response: Demonstrated understanding      PT Lymph Exercises Current Session Time   MODALITIES  CPT 97426 TOTAL TIME FOR SESSION Not performed           THER ACT  CPT 55549 TOTAL TIME FOR SESSION Not performed   Bed Mobility    Transfer Training    Body Mechanics/Work Training    Patient Education    THER EX  CPT 72755 TOTAL TIME FOR SESSION Not performed   CARDIOVASCULAR    Nu Step NV   UBE    STRENGTHENING     Supine Ther-Ex    Standing Ther-Ex Decongestion Ekaterina/hip strength- NV   Seated Ther-Ex    STRETCHING    Core Stabilization    LE Stretching    UE Stretching    Spinal Stretching    Postural     REPEATED MOVEMENTS    NEUROMUSCULAR RE-ED  CPT 38125 TOTAL TIME FOR SESSION Not performed   COORDINATION    POSTURAL RE-ED    PRE-GAIT ACTIVITIES    BALANCE TRAINING    Sitting Balance    Standing Balance    KINESIOTAPE    GAIT TRAINING  CPT 35160 TOTAL TIME FOR SESSION Not performed   Ambulation     Dynamic Gait    MANUAL   CPT 00799 TOTAL TIME FOR SESSION 53-67 Minutes   Education Lymphedema, CDT, skin care, compression biker shorts, compression pumps- YES   Stretching    Mobilization    Massage- Deep  Tissue, Scar, Transverse Friction    Manual Lymph Drainage L UE MLD- educated on, demonstrated, provided handouts- YES  Self MLD- NV   Skin Care- Lotion/Wrapping    Measurement/Fitting Juzo dynamic biker shorts size V length regular- pt owns    L LE volume measurements, LLIS, goal assessment for D/C- YES   Skin Stretching/Mobilization for Cording      Self Care ADL/Home management  CPT 36375 TOTAL TIME FOR SESSION Not performed      Garment Don/Doff    Education    Wearing Schedule          Goals Addressed                   This Visit's Progress     COMPLETED: Lymph Goals        Short Term Goals- 4 weeks  Patient will be (I) with self MLD for  LLE, including short neck, deep breathing, and visceral sequence- partially met, during 2/18 visit PT reviewed, demonstrated and had pt perform parts of self MLD. Pt verbalized and demonstrated understanding.   Patient will own well fitting compression biker shorts-MET  Patient will reduce L LE by >/= 1000mL-MET  Patient will be (I) with initial HEP for decongestion and hip strengthening- not met (due to pt having to cx appointments, unable to provide HEP at this time)    Long Term Goals- 8 weeks  Patient will have compression pump ordered if she decides to do so- not met  Patient will reduce LLE by >/= 2000mL-MET  Patient will improve L LE MMT to at least 4/5 throughout-not tested  Patient will improve LLIS score to </= 7/72-not met (13/72 at D/C)  Patient will be (I) with don/doff compression, self lymphatic drainage, and finalized HEP-partially met (pt independent with don/doff compression, taught self MLD at D/C appointment)         COMPLETED: Mutually agreed upon pain goal        Mutually agreed upon pain goal: 0/10-partially met    2/18: Upon D/C pt reports only tenderness around drain site and in L thigh, no true pain       COMPLETED: Patient Stated Goal        Help manage her lymphedema and prevent from worsening post op-partially met    2/18: PT ordered pt compression  srinivasa castelan, educated pt on resuming wearing compression garments or stocking, taught self MLD